# Patient Record
Sex: FEMALE | Race: WHITE | HISPANIC OR LATINO | Employment: UNEMPLOYED | URBAN - METROPOLITAN AREA
[De-identification: names, ages, dates, MRNs, and addresses within clinical notes are randomized per-mention and may not be internally consistent; named-entity substitution may affect disease eponyms.]

---

## 2021-05-30 ENCOUNTER — HOSPITAL ENCOUNTER (EMERGENCY)
Facility: MEDICAL CENTER | Age: 41
End: 2021-05-30
Attending: EMERGENCY MEDICINE

## 2021-05-30 ENCOUNTER — APPOINTMENT (OUTPATIENT)
Dept: RADIOLOGY | Facility: MEDICAL CENTER | Age: 41
End: 2021-05-30
Attending: EMERGENCY MEDICINE

## 2021-05-30 ENCOUNTER — HOSPITAL ENCOUNTER (OUTPATIENT)
Dept: RADIOLOGY | Facility: MEDICAL CENTER | Age: 41
End: 2021-05-30

## 2021-05-30 VITALS
SYSTOLIC BLOOD PRESSURE: 108 MMHG | RESPIRATION RATE: 21 BRPM | TEMPERATURE: 98.2 F | WEIGHT: 139.99 LBS | HEIGHT: 60 IN | BODY MASS INDEX: 27.48 KG/M2 | HEART RATE: 117 BPM | DIASTOLIC BLOOD PRESSURE: 77 MMHG | OXYGEN SATURATION: 95 %

## 2021-05-30 DIAGNOSIS — R07.2 PRECORDIAL CHEST PAIN: ICD-10-CM

## 2021-05-30 LAB
ALBUMIN SERPL BCP-MCNC: 4 G/DL (ref 3.2–4.9)
ALBUMIN/GLOB SERPL: 1.5 G/DL
ALP SERPL-CCNC: 47 U/L (ref 30–99)
ALT SERPL-CCNC: 22 U/L (ref 2–50)
ANION GAP SERPL CALC-SCNC: 8 MMOL/L (ref 7–16)
AST SERPL-CCNC: 23 U/L (ref 12–45)
BASOPHILS # BLD AUTO: 0.2 % (ref 0–1.8)
BASOPHILS # BLD: 0.04 K/UL (ref 0–0.12)
BILIRUB SERPL-MCNC: 0.3 MG/DL (ref 0.1–1.5)
BUN SERPL-MCNC: 10 MG/DL (ref 8–22)
CALCIUM SERPL-MCNC: 8.2 MG/DL (ref 8.5–10.5)
CHLORIDE SERPL-SCNC: 105 MMOL/L (ref 96–112)
CO2 SERPL-SCNC: 23 MMOL/L (ref 20–33)
CREAT SERPL-MCNC: 0.53 MG/DL (ref 0.5–1.4)
EKG IMPRESSION: NORMAL
EKG IMPRESSION: NORMAL
EOSINOPHIL # BLD AUTO: 0.01 K/UL (ref 0–0.51)
EOSINOPHIL NFR BLD: 0.1 % (ref 0–6.9)
ERYTHROCYTE [DISTWIDTH] IN BLOOD BY AUTOMATED COUNT: 42.1 FL (ref 35.9–50)
FLUAV RNA SPEC QL NAA+PROBE: NEGATIVE
FLUBV RNA SPEC QL NAA+PROBE: NEGATIVE
GLOBULIN SER CALC-MCNC: 2.7 G/DL (ref 1.9–3.5)
GLUCOSE SERPL-MCNC: 111 MG/DL (ref 65–99)
HCT VFR BLD AUTO: 36.2 % (ref 37–47)
HGB BLD-MCNC: 11.9 G/DL (ref 12–16)
IMM GRANULOCYTES # BLD AUTO: 0.08 K/UL (ref 0–0.11)
IMM GRANULOCYTES NFR BLD AUTO: 0.5 % (ref 0–0.9)
LIPASE SERPL-CCNC: 37 U/L (ref 11–82)
LYMPHOCYTES # BLD AUTO: 1.55 K/UL (ref 1–4.8)
LYMPHOCYTES NFR BLD: 9.2 % (ref 22–41)
MCH RBC QN AUTO: 28.9 PG (ref 27–33)
MCHC RBC AUTO-ENTMCNC: 32.9 G/DL (ref 33.6–35)
MCV RBC AUTO: 87.9 FL (ref 81.4–97.8)
MONOCYTES # BLD AUTO: 1.05 K/UL (ref 0–0.85)
MONOCYTES NFR BLD AUTO: 6.3 % (ref 0–13.4)
NEUTROPHILS # BLD AUTO: 14.03 K/UL (ref 2–7.15)
NEUTROPHILS NFR BLD: 83.7 % (ref 44–72)
NRBC # BLD AUTO: 0 K/UL
NRBC BLD-RTO: 0 /100 WBC
PLATELET # BLD AUTO: 266 K/UL (ref 164–446)
PMV BLD AUTO: 9.1 FL (ref 9–12.9)
POTASSIUM SERPL-SCNC: 3.7 MMOL/L (ref 3.6–5.5)
PROT SERPL-MCNC: 6.7 G/DL (ref 6–8.2)
RBC # BLD AUTO: 4.12 M/UL (ref 4.2–5.4)
RSV RNA SPEC QL NAA+PROBE: NEGATIVE
SARS-COV-2 RNA RESP QL NAA+PROBE: NOTDETECTED
SODIUM SERPL-SCNC: 136 MMOL/L (ref 135–145)
SPECIMEN SOURCE: NORMAL
TROPONIN T SERPL-MCNC: 27 NG/L (ref 6–19)
TROPONIN T SERPL-MCNC: 29 NG/L (ref 6–19)
WBC # BLD AUTO: 16.8 K/UL (ref 4.8–10.8)

## 2021-05-30 PROCEDURE — 0241U HCHG SARS-COV-2 COVID-19 NFCT DS RESP RNA 4 TRGT MIC: CPT

## 2021-05-30 PROCEDURE — 80053 COMPREHEN METABOLIC PANEL: CPT

## 2021-05-30 PROCEDURE — 83690 ASSAY OF LIPASE: CPT

## 2021-05-30 PROCEDURE — 71275 CT ANGIOGRAPHY CHEST: CPT

## 2021-05-30 PROCEDURE — 84484 ASSAY OF TROPONIN QUANT: CPT

## 2021-05-30 PROCEDURE — 93005 ELECTROCARDIOGRAM TRACING: CPT | Performed by: EMERGENCY MEDICINE

## 2021-05-30 PROCEDURE — 700117 HCHG RX CONTRAST REV CODE 255: Performed by: EMERGENCY MEDICINE

## 2021-05-30 PROCEDURE — 71045 X-RAY EXAM CHEST 1 VIEW: CPT

## 2021-05-30 PROCEDURE — 85025 COMPLETE CBC W/AUTO DIFF WBC: CPT

## 2021-05-30 PROCEDURE — C9803 HOPD COVID-19 SPEC COLLECT: HCPCS | Performed by: EMERGENCY MEDICINE

## 2021-05-30 PROCEDURE — 99285 EMERGENCY DEPT VISIT HI MDM: CPT

## 2021-05-30 RX ADMIN — IOHEXOL 60 ML: 350 INJECTION, SOLUTION INTRAVENOUS at 17:52

## 2021-05-30 NOTE — ED NOTES
Bloods drawn and sent per cp protocol  MD at bedside speaking to hussain.  Patient remains somnolent, arousable to vocal stimuli and shaking.

## 2021-05-30 NOTE — ED PROVIDER NOTES
ED Provider  Scribed for Floyd Nunez D.O. by Yoan Haney. 5/30/2021  4:09 PM    Means of arrival:Ambulance  History obtained from:Fiance at Bedside  History limited by: Somnolence     CHIEF COMPLAINT  Chief Complaint   Patient presents with    Chest Pain       HPI  Celi Whiting is a 40 y.o. female who presents for evaluation of acute chest pain onset last night. Last night was ate dinner in a hotel and had one mixed drink at 12 AM. Her fiance at bedside states that she does not drink alcohol and so when she became nauseated she had what he suspects was an anxiety attack. She became short of breath and tearful. When she finally calmed down, she went to bed and woke up at 2 AM with moderate chest pain. She got up to walk around to see if this would alleviate her pain, but it did not. She also noted bilateral radiating shoulder pain. She fell back to sleep and when she woke up this morning, her chest pain was still present. She drank some coffee and took a Claritin right after she woke up and tried to smoke some methamphetamine, but could not secondary to worsening shortness of breath. She became fatigued and went to lay down because she suspected she was having some altitude sickness and needed to sleep it off. A short while later, she was still having chest pain and shortness of breath so she presented to the ED at [facility]. While there, her troponin was elevated and so she was transferred to the ED here for further evaluation. Her D-dimer was elevated at 239 and she was positive for methamphetamine. Paramedics gave her ativan and morphine en route and she presents somnolent secondary to these medications. She smokes methamphetamine daily, but does not drink alcohol often. The patient experiences shortness of breath, nausea, and bilateral shoulder pain. Negative for fever, vomiting, syncope, headache, or leg swelling. No alleviating or exacerbating factors identified. The patient has a history of  substance abuse.     Further history of present illness cannot be obtained due to the patient's somnolence.     REVIEW OF SYSTEMS  See HPI for further details. All other systems are negative.   Further review of systems cannot be obtained due to the patient's somnolence.     PAST MEDICAL HISTORY   Substance Abuse    SOCIAL HISTORY  Social History     Tobacco Use    Smoking status: None noted   Substance and Sexual Activity    Alcohol use: Occasional    Drug use: Methamphetamines; smoke    Sexual activity: Male partner       SURGICAL HISTORY  patient denies any surgical history    CURRENT MEDICATIONS  Home Medications    **Home medications have not yet been reviewed for this encounter**         ALLERGIES  None noted.    PHYSICAL EXAM  VITAL SIGNS: /56   Pulse (!) 104   Temp 36.9 °C (98.4 °F) (Oral)   Resp 20   Ht 1.524 m (5')   Wt 63.5 kg (139 lb 15.9 oz)   SpO2 97%   BMI 27.34 kg/m²   Constitutional: Alert in no apparent distress.  HENT: No signs of trauma, mucous membranes are moist  Eyes: Conjunctiva normal, Non-icteric.   Neck: Normal range of motion, No tenderness, Supple.  Lymphatic: No lymphadenopathy noted.   Cardiovascular: Regular rate and rhythm, no murmurs.   Thorax & Lungs: Normal breath sounds, No respiratory distress, No wheezing, No chest tenderness.   Abdomen: Bowel sounds normal, Soft, No tenderness, No masses, No pulsatile masses. No peritoneal signs.  Skin: Warm, Dry, normal color.   Back: No bony tenderness, No CVA tenderness.   Extremities: No edema, No tenderness, No cyanosis  Musculoskeletal: Good range of motion in all major joints. No tenderness to palpation or major deformities noted.   Neurologic: Withdraws from painful stimuli and responds to shaking and loud voice, but does not answer questions   Psychiatric: Cannot be evaluated    DIAGNOSTIC STUDIES / PROCEDURES    EKG  12 Lead EKG interpreted by me shown below.      LABS  Results for orders placed or performed during the  hospital encounter of 05/30/21   CBC with Differential   Result Value Ref Range    WBC 16.8 (H) 4.8 - 10.8 K/uL    RBC 4.12 (L) 4.20 - 5.40 M/uL    Hemoglobin 11.9 (L) 12.0 - 16.0 g/dL    Hematocrit 36.2 (L) 37.0 - 47.0 %    MCV 87.9 81.4 - 97.8 fL    MCH 28.9 27.0 - 33.0 pg    MCHC 32.9 (L) 33.6 - 35.0 g/dL    RDW 42.1 35.9 - 50.0 fL    Platelet Count 266 164 - 446 K/uL    MPV 9.1 9.0 - 12.9 fL    Neutrophils-Polys 83.70 (H) 44.00 - 72.00 %    Lymphocytes 9.20 (L) 22.00 - 41.00 %    Monocytes 6.30 0.00 - 13.40 %    Eosinophils 0.10 0.00 - 6.90 %    Basophils 0.20 0.00 - 1.80 %    Immature Granulocytes 0.50 0.00 - 0.90 %    Nucleated RBC 0.00 /100 WBC    Neutrophils (Absolute) 14.03 (H) 2.00 - 7.15 K/uL    Lymphs (Absolute) 1.55 1.00 - 4.80 K/uL    Monos (Absolute) 1.05 (H) 0.00 - 0.85 K/uL    Eos (Absolute) 0.01 0.00 - 0.51 K/uL    Baso (Absolute) 0.04 0.00 - 0.12 K/uL    Immature Granulocytes (abs) 0.08 0.00 - 0.11 K/uL    NRBC (Absolute) 0.00 K/uL   Complete Metabolic Panel (CMP)   Result Value Ref Range    Sodium 136 135 - 145 mmol/L    Potassium 3.7 3.6 - 5.5 mmol/L    Chloride 105 96 - 112 mmol/L    Co2 23 20 - 33 mmol/L    Anion Gap 8.0 7.0 - 16.0    Glucose 111 (H) 65 - 99 mg/dL    Bun 10 8 - 22 mg/dL    Creatinine 0.53 0.50 - 1.40 mg/dL    Calcium 8.2 (L) 8.5 - 10.5 mg/dL    AST(SGOT) 23 12 - 45 U/L    ALT(SGPT) 22 2 - 50 U/L    Alkaline Phosphatase 47 30 - 99 U/L    Total Bilirubin 0.3 0.1 - 1.5 mg/dL    Albumin 4.0 3.2 - 4.9 g/dL    Total Protein 6.7 6.0 - 8.2 g/dL    Globulin 2.7 1.9 - 3.5 g/dL    A-G Ratio 1.5 g/dL   Troponin   Result Value Ref Range    Troponin T 29 (H) 6 - 19 ng/L   Lipase   Result Value Ref Range    Lipase 37 11 - 82 U/L   COV-2, FLU A/B, AND RSV BY PCR (2-4 HOURS CEPHEID): Collect NP swab in VTM    Specimen: Respirate   Result Value Ref Range    Influenza virus A RNA Negative Negative    Influenza virus B, PCR Negative Negative    RSV, PCR Negative Negative    SARS-CoV-2 by PCR  NotDetected     SARS-CoV-2 Source NP Swab    ESTIMATED GFR   Result Value Ref Range    GFR If African American >60 >60 mL/min/1.73 m 2    GFR If Non African American >60 >60 mL/min/1.73 m 2   TROPONIN   Result Value Ref Range    Troponin T 27 (H) 6 - 19 ng/L   EKG   Result Value Ref Range    Report       [facility] Emergency Dept.    Test Date:  2021  Pt Name:    CECILLE LIVINGSTON               Department: ER  MRN:        [id number]                      Room:       RD 05  Gender:     Female                       Technician: [id number]  :        1980                   Requested By:GAYLE KIRKLAND  Order #:    [id number]                    Reading MD: GAYLE KIRKLAND D.O.    Measurements  Intervals                                Axis  Rate:       106                          P:          46  HI:         148                          QRS:        56  QRSD:       94                           T:          14  QT:         352  QTc:        468    Interpretive Statements  SINUS TACHYCARDIA  No previous ECG available for comparison  Electronically Signed On 2021 19:25:30 PDT by GAYLE KRIKLAND D.O.     EKG   Result Value Ref Range    Report       [facility] Emergency Dept.    Test Date:  2021  Pt Name:    CECILLE LIVINGSTON               Department: ER  MRN:        [id number]                      Room:       RD 05  Gender:     Female                       Technician: [id number]  :        1980                   Requested By:GAYLE KIRKLAND  Order #:    [id number]                    Reading MD: GAYLE KIRKLAND D.O.    Measurements  Intervals                                Axis  Rate:       114                          P:          50  HI:         148                          QRS:        57  QRSD:       92                           T:          7  QT:         344  QTc:        474    Interpretive Statements  SINUS TACHYCARDIA  Compared to ECG 2021  15:49:29  Unchanged  Electronically Signed On 5- 19:25:27 PDT by GAYLE KIRKLAND D.O.       All labs reviewed by me.    RADIOLOGY  CT-CTA CHEST PULMONARY ARTERY W/ RECONS   Final Result         1.  No evidence of pulmonary embolism.   2.  Mild dependent opacities likely atelectasis. Cannot exclude mild pneumonitis. No pleural effusion.               DX-CHEST-PORTABLE (1 VIEW)   Final Result      No acute cardiopulmonary process is seen.      OUTSIDE IMAGES-DX CHEST   Final Result        The radiologist's interpretations of all radiological studies have been reviewed by me.    Films have been independently by me      COURSE  Pertinent Labs & Imaging studies reviewed. (See chart for details)    Review of past medical records shows the patient's EKG at [facility] shows sinus tachycardia with no ischemic changes, an elevated D-dimer at 239, and positive drug test for methamphetamines.     4:09 PM - Patient seen and examined at bedside. Discussed plan of care. Ordered for Troponin, EKG, CMP, CBC with Differential, Lipase, and DX Chest to evaluate her symptoms. The patient is unable to verbalize understanding of agreement secondary to somnolence, but her fiance at bedside is able to on her behalf. I will await lab and radiology results before determining whether interventions are necessary. The patient is agreeable and understanding of my plan of care.     4:18 PM - Ordered for COV-2 Flu A/B and RSV by PCR to further evaluate.     4:30 PM - Ordered for CT CTA Chest Pulmonary Artery to further evaluate.     7:31 PM - Upon repeat evaluation, the patient is resting in bed. She is still somnolent but more easily aroused. I discussed findings with her fiance and plan for discharge. I informed the patient of my plan for discharge, I provided precautions to return for any new or worsening symptoms. The patient verbalized understanding of discharge precautions and is agreeable to my plan.      MEDICAL DECISION  MAKING  This is a 40 y.o. female who presents with episodes of chest pain that happened today.  She was transferred with elevated D-dimer questionable elevated troponin.  I cannot find a troponin level in the records that were provided.    Her troponin level here is elevated to 29.  And her D-dimer was elevated on the records.  CT shows no pulmonary embolism.  Repeat troponin is 27 this is a downward trend.  She is tachycardic but she is methamphetamine which is a probable cause for tachycardia.  Prior to arrival she was medicated with benzodiazepine and narcotics and that she is somnolent.  She is stable for discharge home.      The patient will return for new or worsening symptoms and is stable at the time of discharge.      DISPOSITION:  Patient will be discharged home in stable condition.    FOLLOW UP:  No follow-up provider specified.    OUTPATIENT MEDICATIONS:  There are no discharge medications for this patient.      FINAL IMPRESSION  1. Precordial chest pain         Yoan ROGERS (Claryibmadonna), am scribing for, and in the presence of, Floyd Nunez D.O..    Electronically signed by: Yoan Haney (Shayan), 5/30/2021    Floyd ROGERS D.O. personally performed the services described in this documentation, as scribed by Yoan Haney in my presence, and it is both accurate and complete.    C.     The note accurately reflects work and decisions made by me.  Floyd Nunez D.O.  5/30/2021  9:56 PM

## 2021-05-31 NOTE — ED NOTES
Discharged home with fiance. Given instructions to f/u with cardiologist.   W/c out of department.

## 2021-05-31 NOTE — ED NOTES
Break RN: patient requesting water to [facility], RN provided patient water, sat pt's HOB up. Educated SO to not provide water to patient, unless patient is awake. Water at beside at this time.

## 2021-05-31 NOTE — ED NOTES
Patient unable to keep eyes open to be walked around the department to assess ability to be discharged.

## 2023-07-06 NOTE — DISCHARGE INSTRUCTIONS
There is no signs of heart injury, no signs of heart attack, no signs of lung disease.  Use Motrin Tylenol for discomfort.  I have put in a consult for you to be seen by cardiologist here but if you are visiting, please follow-up with the cardiologist or primary care doctor where you live.   Deirdre Rahman(Resident) What Type Of Note Output Would You Prefer (Optional)?: Standard Output Hpi Title: Evaluation of Skin Lesions How Severe Are Your Spot(S)?: mild Have Your Spot(S) Been Treated In The Past?: has not been treated